# Patient Record
Sex: MALE | Race: WHITE | NOT HISPANIC OR LATINO | URBAN - METROPOLITAN AREA
[De-identification: names, ages, dates, MRNs, and addresses within clinical notes are randomized per-mention and may not be internally consistent; named-entity substitution may affect disease eponyms.]

---

## 2017-02-13 ENCOUNTER — OFFICE VISIT - RIVER FALLS (OUTPATIENT)
Dept: FAMILY MEDICINE | Facility: CLINIC | Age: 37
End: 2017-02-13

## 2017-02-17 ENCOUNTER — OFFICE VISIT - RIVER FALLS (OUTPATIENT)
Dept: FAMILY MEDICINE | Facility: CLINIC | Age: 37
End: 2017-02-17

## 2017-02-17 ASSESSMENT — MIFFLIN-ST. JEOR: SCORE: 1825.25

## 2018-02-08 ENCOUNTER — OFFICE VISIT - RIVER FALLS (OUTPATIENT)
Dept: FAMILY MEDICINE | Facility: CLINIC | Age: 38
End: 2018-02-08

## 2018-02-08 ASSESSMENT — MIFFLIN-ST. JEOR: SCORE: 1821.62

## 2018-02-09 LAB
CHOLEST SERPL-MCNC: 234 MG/DL
CHOLEST/HDLC SERPL: 8.1 {RATIO}
HDLC SERPL-MCNC: 29 MG/DL
LDLC SERPL CALC-MCNC: 181 MG/DL
NONHDLC SERPL-MCNC: 205 MG/DL
TRIGL SERPL-MCNC: 116 MG/DL

## 2018-05-16 ENCOUNTER — OFFICE VISIT - RIVER FALLS (OUTPATIENT)
Dept: FAMILY MEDICINE | Facility: CLINIC | Age: 38
End: 2018-05-16

## 2022-02-11 VITALS
HEART RATE: 79 BPM | SYSTOLIC BLOOD PRESSURE: 100 MMHG | TEMPERATURE: 97.9 F | HEIGHT: 71 IN | DIASTOLIC BLOOD PRESSURE: 64 MMHG | WEIGHT: 198 LBS | BODY MASS INDEX: 27.72 KG/M2

## 2022-02-11 VITALS
TEMPERATURE: 97.5 F | WEIGHT: 197.2 LBS | BODY MASS INDEX: 27.61 KG/M2 | SYSTOLIC BLOOD PRESSURE: 121 MMHG | HEART RATE: 64 BPM | DIASTOLIC BLOOD PRESSURE: 82 MMHG | HEIGHT: 71 IN

## 2022-02-11 VITALS
DIASTOLIC BLOOD PRESSURE: 71 MMHG | SYSTOLIC BLOOD PRESSURE: 117 MMHG | WEIGHT: 204 LBS | HEART RATE: 58 BPM | TEMPERATURE: 97.4 F | BODY MASS INDEX: 28.45 KG/M2

## 2022-02-16 NOTE — PROGRESS NOTES
Patient:   MARY MARVIN            MRN: [id number]            FIN: [id number]               Age:   37 years     Sex:  Male     :  1980   Associated Diagnoses:   Numbness and tingling of right side of face; Family history of heart attack   Author:   Jamarcus Stephens MD      Visit Information      Date of Service: 2018 09:00 am  Performing Location: [facility]  Encounter#: [id number]      Primary Care Provider (PCP):  97 -UNKNOWN,      Referring Provider:  Jamarcus Stephens MD    NPI# [id number]      Chief Complaint   2018 9:08 AM [facility] f/up.        History of Present Illness   In er with right face and arm numbness tingling  sxs for 1 week  getting better  no pain or weakness Nothing provokes  Similar sxs  ng mri  Bread/ bagels can trigger sxs sometimes    Fhx early heart diseae father and pgf 30's      Review of Systems   Constitutional:  Negative except as documented in history of present illness.    Eye:  Negative.    Ear/Nose/Mouth/Throat:  Negative.    Respiratory:  Negative.    Cardiovascular:  Negative.    Gastrointestinal:  Negative.    Genitourinary:  Negative.    Musculoskeletal:  Negative except as documented in history of present illness.    Integumentary:  Negative.    Neurologic:  Negative except as documented in history of present illness.             Health Status   Allergies:    Allergic Reactions (Selected)  No known allergies   Medications:    Medications          No Known Home Medications recorded for this encounter     Problem list:    All Problems  Family history of heart attack / SNOMED CT 791920431 / Confirmed  Seasonal allergies / SNOMED CT 443395574 / Confirmed      Histories   Past Medical History:    Active  Seasonal allergies (069572221)   Family History:    MI  Father (Javi)  Stroke  Father (Javi)  Hypercholesterolemia  Father (Javi)  Brother (Luke)  CAD - Coronary artery disease  Father (Javi)     Procedure history:    Hernia repair (SNOMED  CT 32087942) in 1997 at 17 Years.  Hernia repair (SNOMED CT 23174307) in 1987 at 7 Years.   Social History:        Alcohol Assessment: Current            1-2 times per week                     Comments:                      03/22/2017 - Tani                      2 drinks per time      Tobacco Assessment: Denies Tobacco Use      Substance Abuse Assessment: Denies Substance Abuse      Employment and Education Assessment            Employed, Work/School description: crew coordinator.      Exercise and Physical Activity Assessment: Does not exercise        Physical Examination   Vital Signs   2/8/2018 9:08 AM CST Temperature Tympanic 97.5 DegF  LOW    Peripheral Pulse Rate 64 bpm    HR Method Electronic    Systolic Blood Pressure 121 mmHg    Diastolic Blood Pressure 82 mmHg  HI    Mean Arterial Pressure 95 mmHg    BP Method Electronic      Measurements from flowsheet : Measurements   2/8/2018 9:08 AM CST Height Measured - Standard 71 in    Weight Measured - Standard 197.2 lb    BSA 2.11 m2    Body Mass Index 27.5 kg/m2  HI      General:  Alert and oriented, No acute distress.    Eye:  Pupils are equal, round and reactive to light, Extraocular movements are intact.    Neck:  Supple, Non-tender.    Respiratory:  Lungs are clear to auscultation, Respirations are non-labored.    Cardiovascular:  Normal rate, Regular rhythm.    Integumentary:  No rash.    Neurologic:  Alert, Oriented, Normal sensory, Normal motor function, No focal deficits, Cranial Nerves II-XII are grossly intact, Normal deep tendon reflexes.       Impression and Plan   Diagnosis     Numbness and tingling of right side of face (SIY63-PJ R20.0).     Family history of heart attack (MCA95-LX Z82.49).     Course:  Not progressing as expected.    Plan:  get neuro consult  consider allergic mediated cause  card calc score and labs.    Patient Instructions:       Counseled: Patient, Regarding diagnosis, Regarding treatment, Regarding medications,  Activity.

## 2022-02-16 NOTE — PROGRESS NOTES
Patient:   MARY MARVIN            MRN: [id number]            FIN: [id number]               Age:   36 years     Sex:  Male     :  1980   Associated Diagnoses:   None   Author:   Alvaro Green MD      Visit Information      Date of Service: 2017 10:43 am  Performing Location: [facility]  Encounter#: [id number]      Primary Care Provider (PCP):  [id number] -UNKNOWN,      Referring Provider:  No referring provider recorded for selected visit.      Chief Complaint   2017 11:12 AM CST   Pt here for px and to talk about his labs. Pt has a few moles on his back he would like looked at and a black line on his left pointer finiger.      History of Present Illness   Here for a physical. Exercise and diet were discussed, as well as sleep hygiene  His father had an MI at age 33. He was a cigarette smoker and had an unhealthy diet. His brother has high cholesterol.   His wife thinks a mole on his back has been changing. He has noticed in the last 3 months a black line on his fingernail.   He had a low TSH ecently, and although his thyroid hormone levels were normal, His TSI was high,. He saw an endocrinologist who apparently recommended monitoring but he hasn't had a chance to have any of that bloodwork done      Review of Systems         10 point ROS was negative except as above      Health Status   Allergies:    Allergic Reactions (Selected)  No known allergies   Problem list:    All Problems  Seasonal allergies / SNOMED CT 464562967 / Confirmed      Histories   Past Medical History:    Active  Seasonal allergies (926441739)   Family History:    MI  Father (Javi)  Stroke  Father (Javi)  Hypercholesterolemia  Father (Javi)  Brother (Crow)  CAD - Coronary artery disease  Father (Javi)     Procedure history:    Hernia repair (18567552) in  at 17 Years.  Hernia repair (91308964) in  at 7 Years.   Social History:        Alcohol Assessment: Current      Tobacco Assessment: Denies Tobacco Use       Substance Abuse Assessment: Denies Substance Abuse      Employment and Education Assessment            Employed, Work/School description: crew coordinator.      Exercise and Physical Activity Assessment: Does not exercise        Physical Examination   Vital Signs   2/17/2017 11:12 AM CST Temperature Tympanic 97.9 DegF    Peripheral Pulse Rate 79 bpm    Systolic Blood Pressure 100 mmHg    Diastolic Blood Pressure 64 mmHg    Mean Arterial Pressure 76 mmHg      Measurements from flowsheet : Measurements   2/17/2017 11:12 AM CST Height Measured - Standard 71 in    Weight Measured - Standard 198 lb    BSA 2.12 m2    Body Mass Index 27.61 kg/m2      Skin - likely melanocytic activiation of L 2nd nail. Benign appearing nevi on the back.  Gen - appears well  Eyes - normal  Ears - normal  Mouth - normal  Neck - normal. No thyromegal  CV - normal  Resp - normal  Abdomen - normal  Neuro - normal  Psych - normal      Review / Management   Results review:  Lab results   2/13/2017 4:47 PM CST Sodium Level 140 mmol/L (Modified)    Potassium Level 4.0 mmol/L (Modified)    Chloride Level 102 mmol/L (Modified)    CO2 Level 32 mmol/L  HI (Modified)    Glucose Level 89 mg/dL (Modified)    BUN 18 mg/dL (Modified)    Creatinine 1.13 mg/dL (Modified)    BUN/Creat Ratio NOT APPLICABLE (Modified)    eGFR 83 mL/min/1.73m2 (Modified)    eGFR African American 96 mL/min/1.73m2 (Modified)    Calcium Level 9.8 mg/dL (Modified)    Cholesterol 231 mg/dL  HI (Modified)    Non-  HI (Modified)    HDL 30 mg/dL  LOW (Modified)    Chol/HDL Ratio 7.7  HI (Modified)      HI (Modified)    Triglyceride 157 mg/dL  HI (Modified)   .       Impression and Plan   Preventative care:  - pt declined influenza vaccination  - healthy diet and excercise discussed. Would not recommend starting statin at this point but certainly he is at increased risk for heart problems.  - discussed sleep hygiene - he works night shifts and we discussed melatonin use and  proper sleep habits  - ? Graves - no real symptoms. Not sure why thryoid was checked in the first place. But positive TSI. Will recheck TSH, free t3/t4  - skin/nail changes: referred to derm.

## 2022-02-16 NOTE — PROGRESS NOTES
Patient:   MARY MARVIN            MRN: [id number]            FIN: [id number]               Age:   37 years     Sex:  Male     :  1980   Associated Diagnoses:   Achilles tendinitis   Author:   Jamarcus Stephens MD      Visit Information      Date of Service: 2018 10:50 am  [facility]  Encounter#: [id number]      Primary Care Provider (PCP):  97 -UNKNOWN,      Referring Provider:  Jamarcus Stephens MD    NPI# [id number]      Chief Complaint   2018 10:56 AM CDT   Right ankle/heel pain.  Ongoing for a couple days./        History of Present Illness   chief complaint and symptoms as noted above confirmed with patient   no injury  no rx      Review of Systems   Constitutional:  Negative except as documented in history of present illness.    Musculoskeletal:  Negative except as documented in history of present illness.    Integumentary:  Negative.    Neurologic:  Negative.       Health Status   Allergies:    Allergic Reactions (Selected)  No Known Medication Allergies   Medications:    Medications          No Recorded Medications     Problem list:    All Problems  Family history of heart attack / SNOMED CT 216716634 / Confirmed  Seasonal allergies / SNOMED CT 224423999 / Confirmed      Histories   Past Medical History:    Active  Seasonal allergies (690932283)   Family History:    MI  Father (Javi)  Stroke  Father (Javi)  Hypercholesterolemia  Father (Javi)  Brother (Crow)  CAD - Coronary artery disease  Father (Javi)     Procedure history:    Cardiac computed tomography for calcium scoring (SNOMED CT 7558462785) on 2/15/2018 at 37 Years.  Comments:  2018 11:15 AM - Yumiko Gonzalez  Total Agatston calcium score is 0  Hernia repair (SNOMED CT 80318021) in  at 17 Years.  Hernia repair (SNOMED CT 44248394) in  at 7 Years.   Social History:        Alcohol Assessment: Current            1-2 times per week                     Comments:                       03/22/2017 - Tani                       2 drinks per time      Tobacco Assessment: Denies Tobacco Use      Substance Abuse Assessment: Denies Substance Abuse      Employment and Education Assessment            Employed, Work/School description: crew coordinator.      Exercise and Physical Activity Assessment: Does not exercise        Physical Examination   Vital Signs   5/16/2018 10:56 AM CDT Temperature Tympanic 97.4 DegF  LOW    Peripheral Pulse Rate 58 bpm  LOW    HR Method Electronic    Systolic Blood Pressure 117 mmHg    Diastolic Blood Pressure 71 mmHg    Mean Arterial Pressure 86 mmHg    BP Method Electronic      Measurements from flowsheet : Measurements   5/16/2018 10:56 AM CDT   Weight Measured - Standard                204.0 lb     General:  Alert and oriented, No acute distress.    Musculoskeletal:       Lower extremity exam: Ankle ( Right, Posterior, Ligament  over achiles insertion, No swelling, Tenderness, Normal range of motion ).    Neurologic:  Alert, Oriented.       Impression and Plan   Diagnosis     Achilles tendinitis (KAI74-NV M76.60).     Course:  Progressing as expected.    Plan:  rice stretch  fu 1 week if not better sooner if worse.    Patient Instructions:       Counseled: Patient, Regarding diagnosis, Regarding treatment, Regarding medications, Activity.